# Patient Record
(demographics unavailable — no encounter records)

---

## 2017-11-26 NOTE — PHYS DOC
Past Medical History


Past Medical History:  No Pertinent History


Past Surgical History:  No Surgical History


Alcohol Use:  None


Drug Use:  None





Adult General


Chief Complaint


Chief Complaint:  HEADACHE





HPI


HPI





Patient is a 11  year old male presents the ED complaining of head injury 2 

hours ago. Mother states patient was running and he fell into the wall 

backwards. States she had one episode of vomiting after the accident. Complains 

of headache. Describes the pain as sharp. Rates the pain as 9 out of 10. Denies 

LOC, vision changes, chest pain, shortness of breath, neck pain, abdominal pain

, weakness or dizziness.





Review of Systems


Review of Systems





Constitutional: Denies fever or chills []


Eyes: Denies change in visual acuity, redness, or eye pain []


HENT: Denies nasal congestion or sore throat []


Respiratory: Denies cough or shortness of breath []


Cardiovascular: No additional information not addressed in HPI []


GI: Denies abdominal pain, nausea, vomiting, bloody stools or diarrhea []


: Denies dysuria or hematuria []


Musculoskeletal: Denies back pain or joint pain []


Integument: Denies rash or skin lesions []


Neurologic: Complains of headache. Denies focal weakness or sensory changes []


Endocrine: Denies polyuria or polydipsia []





All other systems were reviewed and found to be within normal limits, except as 

documented in this note.





Current Medications


Current Medications





Current Medications








 Medications


  (Trade)  Dose


 Ordered  [facility]  Start Time


 Stop Time Status Last Admin


Dose Admin


 


 Acetaminophen


  (Tylenol)  325 mg  1X  ONCE  11/27/17 00:15


 11/27/17 00:39 DC 11/27/17 00:30


325 MG











Allergies


Allergies





Allergies








Coded Allergies Type Severity Reaction Last Updated Verified


 


  No Known Drug Allergies    11/27/17 No











Physical Exam


Physical Exam





Constitutional: Well developed, well nourished, no acute distress, non-toxic 

appearance. []


HENT: Normocephalic, atraumatic, bilateral external ears normal, oropharynx 

moist, no oral exudates, nose normal. []


Eyes: PERRLA, EOMI, conjunctiva normal, no discharge. [] 


Neck: Normal range of motion, no tenderness, supple, no stridor. [] 


Cardiovascular:Heart rate regular rhythm, no murmur []


Lungs & Thorax:  Bilateral breath sounds clear to auscultation []


Abdomen: Bowel sounds normal, soft, no tenderness, no masses, no pulsatile 

masses. [] 


Skin: Warm, dry, no erythema, no rash. [] 


Back: No tenderness, no CVA tenderness. [] 


Extremities: No tenderness, no cyanosis, no clubbing, ROM intact, no edema. [] 


Neurologic: Alert and oriented X 3, normal motor function, normal sensory 

function, no focal deficits noted. []


Psychologic: Affect normal, judgement normal, mood normal. []





Current Patient Data


Vital Signs





 Vital Signs








  Date Time  Temp Pulse Resp B/P (MAP) Pulse Ox O2 Delivery O2 Flow Rate FiO2


 


11/27/17 00:24 98.2  20  97   





 98.2       











EKG


EKG


[]





Radiology/Procedures


Radiology/Procedures


PROCEDURE: CT HEAD WO CONTRAST





 


INDICATION: pt.fell; hit back of head


 


COMPARISON: None.


 


TECHNIQUE: 


 


Axial CT images obtained through the head without intravenous contrast.


 


One or more of the following individualized dose reduction techniques were


utilized for this examination:  1. Automated exposure control;  2. 


Adjustment of the mA and/or kV according to patient size;  3. Use of 


iterative reconstruction technique.


 


FINDINGS:


 


No intracranial hemorrhage.


No midline shift.  Basal cisterns patent.


Ventricles and sulci are unremarkable.


No acute osseous abnormality.


Partial opacification of the sphenoid sinus and partially visualized right


maxillary sinus. Could be from sinus congestion or sinusitis


 


IMPRESSION:


 


1. No acute intracranial hemorrhage.[]





Course & Med Decision Making


Course & Med Decision Making


Pertinent Labs and Imaging studies reviewed. (See chart for details)





[]Discussed imaging findings with patient and mother. Patient's headache 

improved. Vital stable, no acute distress. No focal neural deficits. Discussed 

follow-up with pediatrician later this week. Discussed concussion protocol. 

Discussed reasons to return to the ED. Family understands and agrees with plan.





Dragon Disclaimer


Dragon Disclaimer


This electronic medical record was generated, in whole or in part, using a 

voice recognition dictation system.





Departure


Departure


Impression:  


 Primary Impression:  


 Head injury


Disposition:  01 HOME, SELF-CARE


Condition:  IMPROVED


Referrals:  


NO PCP (PCP)








ABDIEL MARTE MD


Patient Instructions:  Concussion and Brain Injury, Head Injury, Child











NADJAANIBAL PA Nov 26, 2017 23:51

## 2017-11-27 NOTE — RAD
INDICATION: pt.fell; hit back of head

 

COMPARISON: None.

 

TECHNIQUE: 

 

Axial CT images obtained through the head without intravenous contrast.

 

One or more of the following individualized dose reduction techniques were

utilized for this examination:  1. Automated exposure control;  2. 

Adjustment of the mA and/or kV according to patient size;  3. Use of 

iterative reconstruction technique.

 

FINDINGS:

 

No intracranial hemorrhage.

No midline shift.  Basal cisterns patent.

Ventricles and sulci are unremarkable.

No acute osseous abnormality.

Partial opacification of the sphenoid sinus and partially visualized right

maxillary sinus. Could be from sinus congestion or sinusitis

 

IMPRESSION:

 

1. No acute intracranial hemorrhage.

 

Electronically signed by: Benjy Lua MD (11/27/2017 12:31 AM) 

[facility]